# Patient Record
Sex: FEMALE | Race: WHITE
[De-identification: names, ages, dates, MRNs, and addresses within clinical notes are randomized per-mention and may not be internally consistent; named-entity substitution may affect disease eponyms.]

---

## 2018-05-04 ENCOUNTER — HOSPITAL ENCOUNTER (EMERGENCY)
Dept: HOSPITAL 68 - ERH | Age: 23
End: 2018-05-04
Payer: COMMERCIAL

## 2018-05-04 VITALS — SYSTOLIC BLOOD PRESSURE: 129 MMHG | DIASTOLIC BLOOD PRESSURE: 75 MMHG

## 2018-05-04 VITALS — HEIGHT: 63 IN | BODY MASS INDEX: 32.25 KG/M2 | WEIGHT: 182 LBS

## 2018-05-04 DIAGNOSIS — E86.0: Primary | ICD-10-CM

## 2018-05-04 LAB
ABSOLUTE GRANULOCYTE CT: 6.5 /CUMM (ref 1.4–6.5)
BASOPHILS # BLD: 0 /CUMM (ref 0–0.2)
BASOPHILS NFR BLD: 0.1 % (ref 0–2)
EOSINOPHIL # BLD: 0.2 /CUMM (ref 0–0.7)
EOSINOPHIL NFR BLD: 2.3 % (ref 0–5)
ERYTHROCYTE [DISTWIDTH] IN BLOOD BY AUTOMATED COUNT: 12.3 % (ref 11.5–14.5)
GRANULOCYTES NFR BLD: 76.2 % (ref 42.2–75.2)
HCT VFR BLD CALC: 39.2 % (ref 37–47)
LYMPHOCYTES # BLD: 1.2 /CUMM (ref 1.2–3.4)
MCH RBC QN AUTO: 30.7 PG (ref 27–31)
MCHC RBC AUTO-ENTMCNC: 34.9 G/DL (ref 33–37)
MCV RBC AUTO: 88.1 FL (ref 81–99)
MONOCYTES # BLD: 0.6 /CUMM (ref 0.1–0.6)
PLATELET # BLD: 307 /CUMM (ref 130–400)
PMV BLD AUTO: 9.3 FL (ref 7.4–10.4)
RED BLOOD CELL CT: 4.45 /CUMM (ref 4.2–5.4)
WBC # BLD AUTO: 8.5 /CUMM (ref 4.8–10.8)

## 2018-05-04 NOTE — ED GENERAL ADULT
History of Present Illness
 
General
Chief Complaint: General Adult
Stated Complaint: PT STATES "I THINK IM DEHYDRATED"
Source: patient
Exam Limitations: no limitations
 
Vital Signs & Intake/Output
Vital Signs & Intake/Output
 Vital Signs
 
 
Date Time Temp Pulse Resp B/P B/P Pulse O2 O2 Flow FiO2
 
     Mean Ox Delivery Rate 
 
05/04 1011 97.0 89 18 129/75  99 Room Air  
 
 
 
Allergies
Coded Allergies:
crab (HIVES 04/25/17)
ibuprofen (HIVES 05/04/18)
 
Reconcile Medications
Estrates H.s. (Estrostep Fe-28 Tablet) 5-7-9-7 TABLET   1 TAB PO DAILY BIRTH 
CONTROL  (Reported)
Insulin Aspart (Novolog) 100 UNIT/ML VIAL INSULIN PUMP  (Reported)
 
Triage Note:
C/O FEELING DIZZY DURING THE NIGHT, STATES SHE
WOKE UP MULTP TIMES DURING THE NIGHT WITH HEADACHE
AND DIZZINESS.  REPORTS SHE WALKED OUTSIDE
YESTERDAY, HAS BEEN DRINKING WATER.  NOW C/O
HEADACHE AND "SLEEPINESS".  PMH: DM, FS GLUCOSE
WAS 72 1 HOUR AGO. HAS AN INSULIN PUMP.
Triage Nurses Notes Reviewed? yes
Onset: Abrupt
Duration: day(s): (1), changing over time, continues in ED
Timing: single episode today
Injury Environment: home
Severity: mild, moderate
Severity Numbers: 4
No Modifying Factors: none
LMP (ages 10-50): unknown
Pregnant: No
Patient currently breastfeeds: No
HPI:
22-year-old female past medical history of migraine headaches and insulin for 
diabetes presents for evaluation of headache and dizziness.  Patient states that
yesterday she took part in a river walk where she was outside walking for an 
extended period of time.  States that she was sweating a lot not drinking much 
water.  She states that after the walk she noted that she was tired had a mild 
headache and dizziness.  She states that she drank a lot of water and was 
feeling better.  She is no longer dizzy but is reporting a mild headache she is 
concerned she still dehydrated.  She's been checking her sugars she has an 
insulin pump.  Her last sugar was 72 about an hour prior to arrival.  She has 
been eating and drinking.  No nausea vomiting diarrhea fever chest pain or 
shortness of breath.
 
Past History
 
Travel History
Traveled to [place] past 21 day No
 
Medical History
Any Pertinent Medical History? see below for history
Neurological: migraine
EENT: NONE
Cardiovascular: NONE
Respiratory: NONE
Gastrointestinal: NONE
Hepatic: NONE
Renal: NONE
Musculoskeletal: NONE
Psychiatric: NONE
Endocrine: diabetes
Blood Disorders: NONE
Cancer(s): NONE
GYN/Reproductive: NONE
 
Surgical History
Surgical History: N
 
Psychosocial History
What is your primary language English
Tobacco Use: Never used
ETOH Use: denies use
 
Family History
Hx Contributory? No
 
Review of Systems
 
Review of Systems
Constitutional:
Reports: no symptoms. 
EENTM:
Reports: no symptoms. 
Respiratory:
Reports: no symptoms. 
Cardiovascular:
Reports: no symptoms. 
GI:
Reports: no symptoms. 
Genitourinary:
Reports: no symptoms. 
Musculoskeletal:
Reports: no symptoms. 
Skin:
Reports: no symptoms. 
Neurological/Psychological:
Reports: see HPI (DIZZY), headache. 
Hematologic/Endocrine:
Reports: no symptoms. 
Immunologic/Allergic:
Reports: no symptoms. 
All Other Systems: Reviewed and Negative
 
Physical Exam
 
Physical Exam
General Appearance: well developed/nourished, no apparent distress, alert, awake
Head: atraumatic, normal appearance
Eyes:
Bilateral: normal appearance, PERRL, EOMI. 
Ears, Nose, Throat: normal pharynx, normal ENT inspection, hearing grossly 
normal, moist mucus membranes
Neck: normal inspection, supple, full range of motion
Respiratory: normal breath sounds, chest non-tender, no respiratory distress, 
lungs clear
Cardiovascular: regular rate/rhythm, normal peripheral pulses
Peripheral Pulses:
2+ radial (R), 2+ radial (L)
Gastrointestinal: soft, non-tender
Back: normal inspection, normal range of motion
Extremities: normal inspection, normal range of motion, no edema
Neurologic/Psych: no motor/sensory deficits, awake, alert, oriented x 3, normal 
gait
Skin: intact, normal color, warm/dry
Lymphatic: no anterior cervical scarlet
 
Core Measures
ACS in differential dx? No
CVA/TIA Diagnosis: No
Sepsis Present: No
Sepsis Focused Exam Completed? No
 
Progress
Differential Diagnoses
I considered the following diagnoses in my evaluation of the patient: [
Dehydration, acute kidney injury, electrolyte unreality, DKA, HHS, hyperglycemia
]
 
Plan of Care:
 Orders
 
 
Procedure Date/time Status
 
SERUM OSMOLALITY 05/04 1057 Complete
 
COMPREHENSIVE METABOLIC PANEL 05/04 1057 Complete
 
CBC WITHOUT DIFFERENTIAL 05/04 1057 Complete
 
ACETONE 05/04 1057 Complete
 
URINE PREGNANCY 05/04 1024 Complete
 
URINALYSIS 05/04 1024 Complete
 
 
 Laboratory Tests
 
 
05/04/18 1128:
Urine Color YEL, Urine Clarity CLEAR, Urine pH 6.0, Ur Specific Gravity 1.025, 
Urine Protein NEG, Urine Ketones TRACE  H, Urine Nitrite NEG, Urine Bilirubin 
NEG, Urine Urobilinogen 0.2, Ur Leukocyte Esterase NEG, Ur Microscopic EXAM NOT 
REQUIRED, Urine Hemoglobin NEG, Urine Glucose >=1000  H, Urine Pregnancy Test 
NEGATIVE
 
05/04/18 1112:
Anion Gap 13, Estimated GFR > 60, BUN/Creatinine Ratio 21.7, Glucose 255  H, 
Serum Osmolality 296  H, Calcium 9.7, Total Bilirubin 0.9, AST 23, ALT 18, 
Alkaline Phosphatase 81, Total Protein 7.6, Albumin 4.3, Globulin 3.3, Albumin/
Globulin Ratio 1.3, CBC w Diff NO MAN DIFF REQ, RBC 4.45, MCV 88.1, MCH 30.7, 
MCHC 34.9, RDW 12.3, MPV 9.3, Gran % 76.2  H, Lymphocytes % 14.3  L, Monocytes %
7.1, Eosinophils % 2.3, Basophils % 0.1, Absolute Granulocytes 6.5, Absolute 
Lymphocytes 1.2, Absolute Monocytes 0.6, Absolute Eosinophils 0.2, Absolute 
Basophils 0, Acetone Level NEGATIVE
 
 
Patient seen and evaluated.  She reports that she was outside doing a [employer] 
yesterday.  She was sweating a lot and not drinking enough water.  She felt 
dizzy and lightheaded and had a headache.  She did drink a lot of water last 
night woke up today without dizziness but still has a persistent mild headache. 
Vital signs are stable blood sugar an hour BEfor arrival 72.  She is 
neurologically intact.
 
Blood work was obtained which shows a sugar of 255-negative anion gap negative 
acetone.  Patient has an insulin pump.  She was given a note given a liter of 
normal saline and instructed to continue fluids continue monitoring sugars eat 3
meals a day.  Make a follow-up with the primary care doctor.  Discussed return 
precautions.  Patient agrees the plan
Initial ED EKG: none
 
Departure
 
Departure
Disposition: HOME OR SELF CARE
Condition: Stable
Clinical Impression
Primary Impression: Dehydration
Referrals:
Edinson MIRANDA,Delfin MORALES (PCP/Family)
 
Additional Instructions:
Continue to rest and plenty of fluids continue to monitor sugars.  Make a follow
-up with her primary care doctor to review all results of today's visit monitor 
symptoms return with any concerns.
Departure Forms:
Customer Survey
General Discharge Information
 
Critical Care Note
 
Critical Care Note
Critical Care Time: non-applicable

## 2018-07-29 ENCOUNTER — HOSPITAL ENCOUNTER (EMERGENCY)
Dept: HOSPITAL 68 - ERH | Age: 23
End: 2018-07-29
Payer: COMMERCIAL

## 2018-07-29 VITALS — HEIGHT: 63 IN | WEIGHT: 180 LBS | BODY MASS INDEX: 31.89 KG/M2

## 2018-07-29 VITALS — SYSTOLIC BLOOD PRESSURE: 130 MMHG | DIASTOLIC BLOOD PRESSURE: 69 MMHG

## 2018-07-29 DIAGNOSIS — R11.0: ICD-10-CM

## 2018-07-29 DIAGNOSIS — E10.649: Primary | ICD-10-CM

## 2018-07-29 DIAGNOSIS — R42: ICD-10-CM

## 2018-07-29 LAB
ABSOLUTE GRANULOCYTE CT: 5.7 /CUMM (ref 1.4–6.5)
BASOPHILS # BLD: 0 /CUMM (ref 0–0.2)
BASOPHILS NFR BLD: 0.3 % (ref 0–2)
EOSINOPHIL # BLD: 0.1 /CUMM (ref 0–0.7)
EOSINOPHIL NFR BLD: 1.8 % (ref 0–5)
ERYTHROCYTE [DISTWIDTH] IN BLOOD BY AUTOMATED COUNT: 12.2 % (ref 11.5–14.5)
GRANULOCYTES NFR BLD: 71.8 % (ref 42.2–75.2)
HCT VFR BLD CALC: 41.2 % (ref 37–47)
LYMPHOCYTES # BLD: 1.5 /CUMM (ref 1.2–3.4)
MCH RBC QN AUTO: 29.9 PG (ref 27–31)
MCHC RBC AUTO-ENTMCNC: 33.5 G/DL (ref 33–37)
MCV RBC AUTO: 89.3 FL (ref 81–99)
MONOCYTES # BLD: 0.5 /CUMM (ref 0.1–0.6)
PLATELET # BLD: 323 /CUMM (ref 130–400)
PMV BLD AUTO: 8.8 FL (ref 7.4–10.4)
RED BLOOD CELL CT: 4.62 /CUMM (ref 4.2–5.4)
WBC # BLD AUTO: 7.9 /CUMM (ref 4.8–10.8)

## 2018-07-29 NOTE — ED GENERAL ADULT
History of Present Illness
 
General
Chief Complaint: General Adult
Stated Complaint: NAUSEA,LIGHTHEADED, TYPE 1 DIABETIC
Source: patient
Exam Limitations: no limitations
 
Vital Signs & Intake/Output
Vital Signs & Intake/Output
 ED Intake and Output
 
 
 07/30 0000 07/29 1200
 
Intake Total  
 
Output Total  
 
Balance  
 
   
 
Patient 180 lb 
 
Weight  
 
Weight Reported by Patient 
 
Measurement  
 
Method  
 
 
 
Allergies
Coded Allergies:
crab (HIVES 04/25/17)
ibuprofen (HIVES 05/04/18)
 
Reconcile Medications
Insulin Aspart (Novolog) 100 UNIT/ML VIAL INSULIN PUMP  (Reported)
Norgestimate-Ethinyl Estradiol (Sprintec 28 Day Tablet) 0.25 MG-35 MCG TABLET   
1 TAB PO DAILY BIRTH CONTROL  (Reported)
 
Triage Note:
21 Y/O FEMALE C/O 2 EPISODES NAUSEA AND
"LIGHTHEADEDNESS" SINCE WAKING TODAY.  PT HX
DIABETES, CHECKED SUGAR AROUND 10AM AND FOUND IT
TO , TOOK NOVOLOG INSULIN BUT UNKNOWN AMOUNT
PER PT.  PT HAS NOT EATEN TODAY.  FINGERSTICK IN
TRIAGE 52.  PROVIDED WITH ORANGE JUICE AND TAKEN
TO ROOM 1 FOR BLOOD DRAW
Triage Nurses Notes Reviewed? yes
Onset: Gradual
Duration: hour(s):
Timing: single episode today
Injury Environment: home
Severity: moderate
Pregnant: No
Patient currently breastfeeds: No
HPI:
23yo female with hx of type 1 DM presents to ED complaining of nausea since this
morning and episode of lightheadedness at work. PAtient states when she woke up 
she felt nauseous, she checked her glucose level which was greater than 300.  
Patient took insulin and went to work.  Patient states while at work she had 
persistent nausea and began feeling lightheaded.  Patient presented here to the 
emergency department at which time blood sugar 52 at triage.  Patient was given 
juice and reports feeling improvement in her symptoms.  She did not eat anything
today.
(Celia Huerta)
 
Past History
 
Travel History
Traveled to [place] past 21 day No
 
Medical History
Any Pertinent Medical History? see below for history
Neurological: migraine
EENT: NONE
Cardiovascular: NONE
Respiratory: NONE
Gastrointestinal: NONE
Hepatic: NONE
Renal: NONE
Musculoskeletal: NONE
Psychiatric: NONE
Endocrine: diabetes
Blood Disorders: NONE
Cancer(s): NONE
GYN/Reproductive: NONE
 
Surgical History
Surgical History: N
 
Psychosocial History
What is your primary language English
Tobacco Use: Never used
 
Family History
Hx Contributory? No
(Celia Huerta)
 
Review of Systems
 
Review of Systems
Constitutional:
Reports: see HPI. 
EENTM:
Reports: no symptoms. 
Respiratory:
Reports: no symptoms. 
Cardiovascular:
Reports: no symptoms. 
GI:
Reports: see HPI. 
Genitourinary:
Reports: no symptoms. 
Musculoskeletal:
Reports: no symptoms. 
Skin:
Reports: no symptoms. 
Neurological/Psychological:
Reports: see HPI. 
Hematologic/Endocrine:
Reports: see HPI. 
Immunologic/Allergic:
Reports: no symptoms. 
All Other Systems: Reviewed and Negative
(Kaila KELLEY,Celia Stapleton)
 
Physical Exam
 
Physical Exam
General Appearance: well developed/nourished, no apparent distress, alert, awake
Head: atraumatic, normal appearance
Eyes:
Bilateral: normal appearance, PERRL, EOMI. 
Ears, Nose, Throat: normal pharynx, hearing grossly normal
Neck: normal inspection, supple, full range of motion
Respiratory: normal breath sounds, no respiratory distress, lungs clear
Cardiovascular: regular rate/rhythm
Gastrointestinal: normal bowel sounds, soft, non-tender, no organomegaly
Back: normal inspection, normal range of motion
Extremities: normal inspection, normal range of motion
Neurologic/Psych: awake, alert, oriented x 3, CNs II-XII nml as tested
Skin: intact, normal color, warm/dry
 
Core Measures
ACS in differential dx? No
CVA/TIA Diagnosis: No
Sepsis Present: No
Sepsis Focused Exam Completed? No
(Kaila KELLEY,Celia Stapleton)
 
Progress
Differential Diagnoses
I considered the following diagnoses in my evaluation of the patient: [
Hyperglycemia, hypoglycemia, dehydration, electrolyte abnormality, anemia]
 
Plan of Care:
 Orders
 
 
Procedure Date/time Status
 
Regular Diet 07/29 D Active
 
URINE PREGNANCY 07/29 1309 Active
 
URINALYSIS 07/29 1309 Active
 
COMPREHENSIVE METABOLIC PANEL 07/29 1309 Complete
 
CBC WITHOUT DIFFERENTIAL 07/29 1309 Complete
 
 
 Laboratory Tests
 
 
 
07/29/18 1321:
Anion Gap 11, Estimated GFR > 60, BUN/Creatinine Ratio 25.0, Glucose 52  L, 
Calcium 9.5, Total Bilirubin 0.5, AST 18, ALT 22, Alkaline Phosphatase 89, Total
Protein 7.5, Albumin 4.0, Globulin 3.5, Albumin/Globulin Ratio 1.1, CBC w Diff 
NO MAN DIFF REQ, RBC 4.62, MCV 89.3, MCH 29.9, MCHC 33.5, RDW 12.2, MPV 8.8, 
Gran % 71.8, Lymphocytes % 19.3  L, Monocytes % 6.8, Eosinophils % 1.8, 
Basophils % 0.3, Absolute Granulocytes 5.7, Absolute Lymphocytes 1.5, Absolute 
Monocytes 0.5, Absolute Eosinophils 0.1, Absolute Basophils 0
 
Fingerstick glucose levels here at than low, patient has been given juice and 
snacks to help elevated blood sugar.  After repeat low glucose level patient was
given a meal, she did not eat today.  Final reading shows normalized blood sugar
of 142.  Patient observed for greater than 3 hours.  She feels ready to go home 
at this time, her previous symptoms are resolved.  Patient educated to monitor 
her glucose levels closely for the rest of the day and uses insulin as 
prescribed for hyperglycemia or eat a snack for low blood sugars.  She was given
strict return precautions for high or low blood sugars and agrees with the plan 
of care.  She is ambulatory without difficulty leaving the emergency department.
Initial ED EKG: none
(Kaila KELLEY,Celia Stapleton)
 
Departure
 
Departure
Disposition: HOME OR SELF CARE
Condition: Stable
Clinical Impression
Primary Impression: Hypoglycemia
Referrals:
Edinson MIRANDA,Delfin MORALES (PCP/Family)
 
Additional Instructions:
Continue to check her blood sugar frequently throughout the day.  Especially 
checked her blood pressure if you had further symptoms.  If your blood pressure 
is elevated use insulin as directed.  If her blood sugar is low drink juice.  If
your blood sugar is less than 70 or greater than 300 or if you have worsening 
symptoms please return here to the emergency department.
 
Please note that there might be incidental findings in your evaluation that are 
unrelated to the current emergency department visit.  Please notify your primary
care doctor about this emergency department visit in order to obtain and review 
all of the testing performed so that these incidental findings can be monitored 
as needed.
 
If you had an x-ray performed, please understand that some fractures may not be 
seen on the initial set of x-rays.  If your symptoms persist you might need a 
repeat set of x-rays to check for such a fracture.
 
If you had a laceration evaluated, please understand that foreign bodies such as
glass or wood may not be visible to the naked eye or on plain x-rays.  If the 
wound becomes red, swollen, increasingly more painful or if there is any 
drainage from the wound, please have it reevaluated by a physician for the 
possibility of a retained foreign body.
 
If you're unable to follow up as outlined in the discharge instructions please 
return to the emergency department.
 
Thank you for choosing the [facility] Emergency Department for your care.
It was a pleasure to serve you today.
Departure Forms:
Customer Survey
General Discharge Information
(Kaila KELLEY,Celia Stapleton)
 
PA/NP Co-Sign Statement
Statement:
ED Attending supervision documentation-
 
[] I saw and evaluated the patient. I have also reviewed all the pertinent lab 
results and diagnostic results. I agree with the findings and the plan of care 
as documented in the PA's/NP's documentation. 
 
[x] I have reviewed the ED Record and agree with the PA's/NP's documentation.
 
[] Additions or exceptions (if any) to the PAs/NP's note and plan are 
summarized below:
[]
 
(Taylor MIRANDA,[facility])
 
Critical Care Note
 
Critical Care Note
Critical Care Time: non-applicable
(Kaila KELLEY,Celia Stapleton)